# Patient Record
Sex: MALE | Race: OTHER | HISPANIC OR LATINO | Employment: FULL TIME | URBAN - METROPOLITAN AREA
[De-identification: names, ages, dates, MRNs, and addresses within clinical notes are randomized per-mention and may not be internally consistent; named-entity substitution may affect disease eponyms.]

---

## 2019-02-21 ENCOUNTER — APPOINTMENT (OUTPATIENT)
Dept: RADIOLOGY | Facility: MEDICAL CENTER | Age: 34
End: 2019-02-21
Payer: COMMERCIAL

## 2019-02-21 ENCOUNTER — HOSPITAL ENCOUNTER (EMERGENCY)
Facility: MEDICAL CENTER | Age: 34
End: 2019-02-21
Attending: EMERGENCY MEDICINE
Payer: COMMERCIAL

## 2019-02-21 VITALS
HEART RATE: 95 BPM | DIASTOLIC BLOOD PRESSURE: 80 MMHG | TEMPERATURE: 98.6 F | HEIGHT: 66 IN | RESPIRATION RATE: 18 BRPM | BODY MASS INDEX: 27.28 KG/M2 | SYSTOLIC BLOOD PRESSURE: 133 MMHG | OXYGEN SATURATION: 95 % | WEIGHT: 169.75 LBS

## 2019-02-21 DIAGNOSIS — J40 BRONCHITIS: ICD-10-CM

## 2019-02-21 LAB — EKG IMPRESSION: NORMAL

## 2019-02-21 PROCEDURE — 93005 ELECTROCARDIOGRAM TRACING: CPT | Performed by: EMERGENCY MEDICINE

## 2019-02-21 PROCEDURE — 71045 X-RAY EXAM CHEST 1 VIEW: CPT

## 2019-02-21 PROCEDURE — 99284 EMERGENCY DEPT VISIT MOD MDM: CPT

## 2019-02-21 PROCEDURE — 700102 HCHG RX REV CODE 250 W/ 637 OVERRIDE(OP): Performed by: EMERGENCY MEDICINE

## 2019-02-21 PROCEDURE — 93005 ELECTROCARDIOGRAM TRACING: CPT

## 2019-02-21 PROCEDURE — A9270 NON-COVERED ITEM OR SERVICE: HCPCS | Performed by: EMERGENCY MEDICINE

## 2019-02-21 RX ORDER — DEXAMETHASONE 4 MG/1
8 TABLET ORAL ONCE
Status: COMPLETED | OUTPATIENT
Start: 2019-02-21 | End: 2019-02-21

## 2019-02-21 RX ORDER — ALBUTEROL SULFATE 90 UG/1
2 AEROSOL, METERED RESPIRATORY (INHALATION) EVERY 6 HOURS PRN
Qty: 8.5 G | Refills: 0 | Status: SHIPPED | OUTPATIENT
Start: 2019-02-21

## 2019-02-21 RX ADMIN — DEXAMETHASONE 8 MG: 4 TABLET ORAL at 20:44

## 2019-02-21 ASSESSMENT — PAIN DESCRIPTION - DESCRIPTORS: DESCRIPTORS: ACHING;NAGGING

## 2019-02-22 NOTE — ED PROVIDER NOTES
"ED Provider Note    ED Provider Note    Primary care provider: No primary care provider on file.  Means of arrival: Walk-in  History obtained from: Patient    CHIEF COMPLAINT  Chief Complaint   Patient presents with   • Chest Pain   • Cough     Seen at 8:03 PM.   HPI  Abraham Bazan is a 33 y.o. male who presents to the Emergency Department with 2 days of subjective fevers and chills followed by 2 days of a cough and chest pain when he coughs.  He notes the cough is exacerbated by vigorous activity and the cold air.  His daughter has similar symptoms.  He denies any history of reactive airway or asthma.  He does not smoke.  He denies any chest pain without deep inspiration.  He denies any nausea, vomiting, abdominal pain, numbness, weakness or bleeding diathesis.  .  The history was obtained using the translation tablet.  REVIEW OF SYSTEMS  See HPI,   Remainder of ROS negative.     PAST MEDICAL HISTORY   Denies    SURGICAL HISTORY  patient denies any surgical history    SOCIAL HISTORY  Social History   Substance Use Topics   • Smoking status: Never Smoker   • Smokeless tobacco: Never Used   • Alcohol use No      History   Drug Use No       FAMILY HISTORY  History reviewed. No pertinent family history.    CURRENT MEDICATIONS  Reviewed.  See Encounter Summary.     ALLERGIES  No Known Allergies    PHYSICAL EXAM  VITAL SIGNS: /80   Pulse 95   Temp 37 °C (98.6 °F) (Temporal)   Resp 18   Ht 1.676 m (5' 6\")   Wt 77 kg (169 lb 12.1 oz)   SpO2 95%   BMI 27.40 kg/m²   Constitutional: Awake, alert in no apparent distress.  Occasional dry cough.  Well-appearing 33-year-old male.  HENT: Normocephalic, Bilateral external ears normal. Nose normal.   Eyes: Conjunctiva normal, non-icteric, EOMI.    Thorax & Lungs: Slightly prolonged expiratory phase, otherwise clear to ascultation bilaterally.  Cardiovascular: Borderline tachycardic, no murmurs, rubs or gallops.  Abdomen:  Soft, nontender, nondistended, normal " active bowel sounds.   :    Skin: Visualized skin is  Dry, No erythema, No rash.   Musculoskeletal:   No cyanosis, clubbing or edema.  Neurologic: Alert, Grossly non-focal.   Psychiatric: Normal affect, Normal mood  Lymphatic:  No cervical LAD    EKG   12 lead Interpreted by me  Rhythm:  Normal sinus rhythm   Rate: 99  Axis: normal  Ectopy: none  Conduction: normal  ST Segments: no acute change  T Waves: no acute change  Clinical Impression: Normal EKG without acute changes     RADIOLOGY  DX-CHEST-LIMITED (1 VIEW)   Final Result         1.  No acute cardiopulmonary disease.            COURSE & MEDICAL DECISION MAKING  Pertinent Labs & Imaging studies reviewed. (See chart for details)      8:03 PM - Medical record reviewed, patient seen and examined at bedside.    Decision Making:  This is a 33 y.o. year old male who presents with chest pain when coughing and some difficulty breathing when exerting himself or when out in the cold.  His lungs are mostly clear today though the exertion and cold air component is suggestive of reactive airways.  He also reported a fever for the past 48 hours.  Chest x-ray was ordered at triage that is negative for infiltrate.  I do not suspect pneumonia, I do not feel there is any indication for antibiotics.  The patient was treated with dexamethasone in the emergency department with discharge with albuterol inhaler.    He did get an EKG at triage that is essentially normal with borderline sinus tachycardia.  I do not suspect a cardiac etiology for the patient's pleuritic chest pain.  I do not suspect acute pulmonary embolism either.  He is PERC negative.    Discharge Medications:  Discharge Medication List as of 2/21/2019  8:39 PM      START taking these medications    Details   albuterol 108 (90 Base) MCG/ACT Aero Soln inhalation aerosol Inhale 2 Puffs by mouth every 6 hours as needed for Shortness of Breath., Disp-8.5 g, R-0, Print Rx Paper             The patient was discharged  home (see d/c instructions) and parent was told to return immediately for any signs or symptoms listed, or any worsening at all.  The patient's parent verbally agreed to the discharge precautions and follow-up plan which is documented in EPIC.    The patient's blood pressure is elevated today. >120/80. I have referred them to primary care for follow up.       FINAL IMPRESSION  1. Bronchitis

## 2019-02-22 NOTE — ED TRIAGE NOTES
"Abraham Bazan  Chief Complaint   Patient presents with   • Chest Pain   • Cough     Yakut speaking,  used.  Pt ambulatory to triage with above complaint. Pt states CP started 2 days ago while at work. Pt denies any falls, injury, or trauma. Pt states pain is increased upon inspiration. Pt reports radiation of pain to back. Pt describes pain as \"scraping\" feeling inside chest. Pt reports non-productive cough and states cough worsens when outside breathing cold air. Pt denies any medical hx or medications.   EKG completed in triage.     /71   Pulse (!) 119   Temp 37 °C (98.6 °F) (Temporal)   Resp 20   Ht 1.676 m (5' 6\")   Wt 77 kg (169 lb 12.1 oz)   SpO2 95%   BMI 27.40 kg/m²     Pt informed of triage process and encouraged to notify staff of any changes or concerns. Pt verbalized understanding of instructions. Apologized for long wait time. Pt placed back in lobby.     "

## 2019-02-22 NOTE — ED NOTES
Pt reportr that he had fever and chills on Monday/Tuesday. Today started having significant cough which is causing him to have chest pain. Patients daughter has been ill, had similar S/Sx.

## 2022-03-05 ENCOUNTER — HOSPITAL ENCOUNTER (EMERGENCY)
Facility: MEDICAL CENTER | Age: 37
End: 2022-03-05
Attending: EMERGENCY MEDICINE
Payer: COMMERCIAL

## 2022-03-05 VITALS
SYSTOLIC BLOOD PRESSURE: 118 MMHG | HEART RATE: 72 BPM | HEIGHT: 66 IN | BODY MASS INDEX: 25.71 KG/M2 | DIASTOLIC BLOOD PRESSURE: 78 MMHG | WEIGHT: 160 LBS | RESPIRATION RATE: 18 BRPM | OXYGEN SATURATION: 96 % | TEMPERATURE: 98.1 F

## 2022-03-05 DIAGNOSIS — Z87.898 HISTORY OF ACUTE ALCOHOL INTOXICATION: ICD-10-CM

## 2022-03-05 DIAGNOSIS — Z00.8 MEDICAL CLEARANCE FOR INCARCERATION: ICD-10-CM
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
## 2022-03-05 PROCEDURE — 99284 EMERGENCY DEPT VISIT MOD MDM: CPT
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
## 2022-03-05 NOTE — DISCHARGE INSTRUCTIONS
Patient is medically cleared for discharge with PD.    Follow-up with primary care or [facility] MD 1 day for reevaluation.    Diet and activity as tolerated.    Return to the emergency department for altered mental status, seizure, focal weakness, hallucination, chest pain or shortness of breath, abdominal pain, vomiting, extremity pain or swelling, or other new concerns.